# Patient Record
Sex: FEMALE | Race: WHITE | NOT HISPANIC OR LATINO | Employment: OTHER | URBAN - METROPOLITAN AREA
[De-identification: names, ages, dates, MRNs, and addresses within clinical notes are randomized per-mention and may not be internally consistent; named-entity substitution may affect disease eponyms.]

---

## 2018-12-18 ENCOUNTER — HOSPITAL ENCOUNTER (EMERGENCY)
Facility: HOSPITAL | Age: 43
Discharge: HOME | End: 2018-12-18
Attending: EMERGENCY MEDICINE
Payer: COMMERCIAL

## 2018-12-18 VITALS
BODY MASS INDEX: 22.99 KG/M2 | SYSTOLIC BLOOD PRESSURE: 123 MMHG | HEART RATE: 96 BPM | RESPIRATION RATE: 20 BRPM | TEMPERATURE: 98 F | WEIGHT: 138 LBS | DIASTOLIC BLOOD PRESSURE: 80 MMHG | HEIGHT: 65 IN | OXYGEN SATURATION: 100 %

## 2018-12-18 DIAGNOSIS — S09.90XA MINOR HEAD INJURY: Primary | ICD-10-CM

## 2018-12-18 PROCEDURE — 99283 EMERGENCY DEPT VISIT LOW MDM: CPT

## 2018-12-18 RX ORDER — ALBUTEROL SULFATE 90 UG/1
AEROSOL, METERED RESPIRATORY (INHALATION)
COMMUNITY
Start: 2018-05-02

## 2018-12-18 RX ORDER — LEVOTHYROXINE SODIUM 0.15 MG/1
TABLET ORAL
COMMUNITY
Start: 2018-05-09

## 2018-12-18 RX ORDER — MONTELUKAST SODIUM 10 MG/1
TABLET ORAL
COMMUNITY
Start: 2018-05-02

## 2018-12-18 NOTE — ED PROVIDER NOTES
History  Chief Complaint   Patient presents with    Head Injury     Patient states that she was helping a friend clean their handicap [employer] castellano and the ramp that was folded up came down and hit her in the back of her head  Patient denies any LOC  Complains of headaceh 2/10 and nedck pain  No tenderness to palpation in her neck or upper back     Here w/ head injury  Had ramp of a wheelchair van fall and hit her on the back of the head  Denies LOC  Has pain to the back of the head, headache  Denies neck or back pain, no blurred or double vision, no changes in speech, no extremity numnbess or weakness  Feels a little lightheaded and nauseated, but not bad  No hx of prev head injuries  No anticoag/antiplatelet agents  History provided by:  Patient   used: No    Head Injury w/unknown LOC   Location:  Occipital  Mechanism of injury comment:  Direct blow  Pain details:     Quality:  Dull    Radiates to: none  Timing:  Constant    Progression:  Unchanged  Chronicity:  New  Relieved by:  Nothing  Worsened by:  Nothing  Ineffective treatments:  None tried  Associated symptoms: headache and nausea    Associated symptoms: no blurred vision, no double vision, no loss of consciousness, no neck pain, no numbness and no vomiting        Prior to Admission Medications   Prescriptions Last Dose Informant Patient Reported? Taking? albuterol (PROAIR HFA) 90 mcg/act inhaler   Yes Yes   levothyroxine 150 mcg tablet   Yes Yes   montelukast (SINGULAIR) 10 mg tablet   Yes Yes      Facility-Administered Medications: None       Past Medical History:   Diagnosis Date    Asthma     Disease of thyroid gland     Psychiatric disorder        History reviewed  No pertinent surgical history  History reviewed  No pertinent family history  I have reviewed and agree with the history as documented      Social History   Substance Use Topics    Smoking status: Current Every Day Smoker    Smokeless tobacco: Never Used  Alcohol use Yes        Review of Systems   Eyes: Negative for blurred vision and double vision  Gastrointestinal: Positive for nausea  Negative for vomiting  Musculoskeletal: Negative for neck pain  Neurological: Positive for headaches  Negative for loss of consciousness and numbness  All other systems reviewed and are negative  Physical Exam  Physical Exam   Constitutional: She is oriented to person, place, and time  She appears well-developed and well-nourished  HENT:   Head: Normocephalic and atraumatic  Right Ear: External ear normal    Left Ear: External ear normal    Nose: Nose normal    Mouth/Throat: Oropharynx is clear and moist    Eyes: Conjunctivae and EOM are normal    Neck: Neck supple  No spinous process tenderness present  Cardiovascular: Normal rate and regular rhythm  Pulmonary/Chest: Effort normal and breath sounds normal    Abdominal: Soft  There is no tenderness  Musculoskeletal: She exhibits no deformity  Neurological: She is alert and oriented to person, place, and time  She has normal strength  No cranial nerve deficit or sensory deficit  GCS eye subscore is 4  GCS verbal subscore is 5  GCS motor subscore is 6  Skin: Skin is warm  Psychiatric: She has a normal mood and affect  Nursing note and vitals reviewed        Vital Signs  ED Triage Vitals [12/18/18 1650]   Temperature Pulse Respirations Blood Pressure SpO2   98 °F (36 7 °C) 96 20 123/80 100 %      Temp src Heart Rate Source Patient Position - Orthostatic VS BP Location FiO2 (%)   -- -- -- -- --      Pain Score       2           Vitals:    12/18/18 1650   BP: 123/80   Pulse: 96       Visual Acuity      ED Medications  Medications - No data to display    Diagnostic Studies  Results Reviewed     None                 No orders to display              Procedures  Procedures       Phone Contacts  ED Phone Contact    ED Course                               MDM  Number of Diagnoses or Management Options  Minor head injury: new and does not require workup    CritCare Time    Disposition  Final diagnoses:   Minor head injury     Time reflects when diagnosis was documented in both MDM as applicable and the Disposition within this note     Time User Action Codes Description Comment    12/18/2018  5:00 PM Lizett Gooden Add [[id number]] Minor head injury       ED Disposition     ED Disposition Condition Comment    Discharge  Silvana Cary discharge to home/self care  Condition at discharge: Good        Follow-up Information     Follow up With Specialties Details Why Guanako Romero MD Family Medicine In 2 days If symptoms worsen [address] [facility]  [phone]            Discharge Medication List as of 12/18/2018  5:00 PM      CONTINUE these medications which have NOT CHANGED    Details   albuterol (PROAIR HFA) 90 mcg/act inhaler Starting Wed 5/2/2018, Historical Med      levothyroxine 150 mcg tablet Starting Wed 5/9/2018, Historical Med      montelukast (SINGULAIR) 10 mg tablet Starting Wed 5/2/2018, Historical Med           No discharge procedures on file      ED Provider  Electronically Signed by           Monica Tejada DO  12/18/18 [id number]

## 2018-12-18 NOTE — DISCHARGE INSTRUCTIONS

## 2021-04-08 DIAGNOSIS — Z23 ENCOUNTER FOR IMMUNIZATION: ICD-10-CM
